# Patient Record
Sex: MALE
[De-identification: names, ages, dates, MRNs, and addresses within clinical notes are randomized per-mention and may not be internally consistent; named-entity substitution may affect disease eponyms.]

---

## 2020-04-08 ENCOUNTER — HOSPITAL ENCOUNTER (EMERGENCY)
Dept: HOSPITAL 53 - M ED | Age: 20
Discharge: HOME | End: 2020-04-08
Payer: COMMERCIAL

## 2020-04-08 VITALS — DIASTOLIC BLOOD PRESSURE: 72 MMHG | SYSTOLIC BLOOD PRESSURE: 143 MMHG

## 2020-04-08 VITALS — BODY MASS INDEX: 22.26 KG/M2 | WEIGHT: 150.31 LBS | HEIGHT: 69 IN

## 2020-04-08 DIAGNOSIS — S01.81XA: Primary | ICD-10-CM
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
## 2020-04-08 DIAGNOSIS — S02.2XXA: ICD-10-CM
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
## 2020-04-08 DIAGNOSIS — S06.0X9A: ICD-10-CM

## 2020-04-08 NOTE — REP
CT BRAIN WITHOUT CONTRAST:

 

HISTORY:  Facial trauma.

 

FINDINGS:  There is a minimally depressed fracture of the right side of the nasal

bone visible at the bottom edge of the field of view.  Visualized paranasal

sinuses are clear.  No other facial, orbital or calvarial fracture is seen.

 

There is no evidence of intracranial hemorrhage.  No extra-axial fluid collection

is seen.  Lateral, third, fourth ventricles are normal in size and position.  No

contusion, infarct or mass is seen.

 

IMPRESSION:

 

Right nasal bone fracture.  Associated right periorbital soft tissue swelling.

Otherwise negative noncontrast head CT.  No intracranial injury seen.

 

 

Electronically Signed by

Cmailo Zaman MD 04/08/2020 12:13 P

## 2020-04-08 NOTE — REP
MAXILLOFACIAL CT STUDY WITHOUT CONTRAST:

 

HISTORY:  Facial trauma.

 

FINDINGS:  There are bilateral nasal bone fractures.  The right side of the nasal

bone is minimally depressed.  There is right malar and periorbital soft tissue

swelling.  The inferior maxillary spine is intact.  No zygomatic arch fracture is

seen.  Orbital margins are intact.  No intraorbital hematoma is appreciated.  The

paranasal sinus walls are clear.  There is minimal mucosal thickening affecting

the maxillary sinuses bilaterally.  No mandibular fracture is seen.  No skull

base fracture noted.

 

IMPRESSION:

 

Nasal fracture with minimal depression on the right side.  Mild mucosal

thickening in the maxillary sinuses bilaterally.  No other fracture seen.  Right

periorbital and preseptal soft tissue swelling.

 

 

Electronically Signed by

Camilo Zaman MD 04/08/2020 12:13 P